# Patient Record
Sex: MALE | URBAN - METROPOLITAN AREA
[De-identification: names, ages, dates, MRNs, and addresses within clinical notes are randomized per-mention and may not be internally consistent; named-entity substitution may affect disease eponyms.]

---

## 2021-02-03 ENCOUNTER — OFFICE VISIT (OUTPATIENT)
Dept: URBAN - METROPOLITAN AREA CLINIC 70 | Facility: CLINIC | Age: 26
End: 2021-02-03

## 2021-06-21 ENCOUNTER — OFFICE VISIT (OUTPATIENT)
Dept: URBAN - METROPOLITAN AREA CLINIC 70 | Facility: CLINIC | Age: 26
End: 2021-06-21

## 2021-06-21 NOTE — HPI-TODAY'S VISIT:
Yadira presents today for evaluation of complaints of jaundice.   He was seen in October 2020 in the ER at [facility] with similar complaints.   Labs at that time showed Alk Phos 226, , ALT 67, TB 15.1, WBC 10.20, Hgb 14.7, , MCV 93.6, INR 1.20, BUN 5 and creatinine 0.30.  RUQ US also obtained and showed enlarged fatty liver, cirrhosis could not be excluded, trace perihepatic fluid that was not enough to tap and normal bile ducts.